# Patient Record
Sex: MALE | Race: WHITE | NOT HISPANIC OR LATINO | Employment: OTHER | URBAN - METROPOLITAN AREA
[De-identification: names, ages, dates, MRNs, and addresses within clinical notes are randomized per-mention and may not be internally consistent; named-entity substitution may affect disease eponyms.]

---

## 2023-08-28 ENCOUNTER — TELEPHONE (OUTPATIENT)
Dept: SCHEDULING | Facility: IMAGING CENTER | Age: 83
End: 2023-08-28

## 2023-08-31 ENCOUNTER — APPOINTMENT (OUTPATIENT)
Dept: MEDICAL GROUP | Facility: PHYSICIAN GROUP | Age: 83
End: 2023-08-31
Payer: COMMERCIAL

## 2023-09-05 ENCOUNTER — OFFICE VISIT (OUTPATIENT)
Dept: MEDICAL GROUP | Facility: PHYSICIAN GROUP | Age: 83
End: 2023-09-05
Payer: MEDICARE

## 2023-09-05 VITALS
OXYGEN SATURATION: 94 % | HEART RATE: 82 BPM | WEIGHT: 238 LBS | DIASTOLIC BLOOD PRESSURE: 70 MMHG | RESPIRATION RATE: 12 BRPM | HEIGHT: 69 IN | SYSTOLIC BLOOD PRESSURE: 112 MMHG | BODY MASS INDEX: 35.25 KG/M2 | TEMPERATURE: 98.2 F

## 2023-09-05 DIAGNOSIS — N40.1 BENIGN PROSTATIC HYPERPLASIA WITH NOCTURIA: ICD-10-CM

## 2023-09-05 DIAGNOSIS — D32.0 BENIGN MENINGIOMA OF BRAIN (HCC): ICD-10-CM

## 2023-09-05 DIAGNOSIS — R35.1 BENIGN PROSTATIC HYPERPLASIA WITH NOCTURIA: ICD-10-CM

## 2023-09-05 DIAGNOSIS — I25.10 CORONARY ARTERY DISEASE INVOLVING NATIVE CORONARY ARTERY OF NATIVE HEART WITHOUT ANGINA PECTORIS: ICD-10-CM

## 2023-09-05 DIAGNOSIS — I10 PRIMARY HYPERTENSION: ICD-10-CM

## 2023-09-05 DIAGNOSIS — I82.402 ACUTE DEEP VEIN THROMBOSIS (DVT) OF LEFT LOWER EXTREMITY, UNSPECIFIED VEIN (HCC): ICD-10-CM

## 2023-09-05 PROCEDURE — 99204 OFFICE O/P NEW MOD 45 MIN: CPT | Performed by: PHYSICIAN ASSISTANT

## 2023-09-05 PROCEDURE — 3074F SYST BP LT 130 MM HG: CPT | Performed by: PHYSICIAN ASSISTANT

## 2023-09-05 PROCEDURE — 3078F DIAST BP <80 MM HG: CPT | Performed by: PHYSICIAN ASSISTANT

## 2023-09-05 RX ORDER — METHYLPREDNISOLONE 4 MG/1
TABLET ORAL
COMMUNITY
Start: 2023-08-11 | End: 2023-10-17

## 2023-09-05 RX ORDER — CEFDINIR 300 MG/1
300 CAPSULE ORAL 2 TIMES DAILY
COMMUNITY
End: 2023-10-17

## 2023-09-05 RX ORDER — FINASTERIDE 5 MG/1
TABLET, FILM COATED ORAL
COMMUNITY
Start: 2023-07-18 | End: 2023-10-17

## 2023-09-05 RX ORDER — ALFUZOSIN HYDROCHLORIDE 10 MG/1
TABLET, EXTENDED RELEASE ORAL
COMMUNITY
Start: 2023-06-12 | End: 2023-10-17

## 2023-09-05 RX ORDER — CIPROFLOXACIN 500 MG/1
TABLET, FILM COATED ORAL
COMMUNITY
Start: 2023-06-26 | End: 2023-10-17

## 2023-09-05 RX ORDER — TAMSULOSIN HYDROCHLORIDE 0.4 MG/1
CAPSULE ORAL
COMMUNITY
Start: 2023-06-21 | End: 2023-10-17

## 2023-09-05 RX ORDER — CARVEDILOL 6.25 MG/1
6.25 TABLET ORAL 2 TIMES DAILY
COMMUNITY
Start: 2023-06-08 | End: 2023-10-17

## 2023-09-05 RX ORDER — DEXAMETHASONE 4 MG/1
TABLET ORAL
COMMUNITY
Start: 2023-08-22 | End: 2023-10-17

## 2023-09-05 RX ORDER — TRAVOPROST OPHTHALMIC SOLUTION 0.04 MG/ML
SOLUTION OPHTHALMIC
COMMUNITY
Start: 2023-07-24

## 2023-09-05 RX ORDER — ATORVASTATIN CALCIUM 40 MG/1
40 TABLET, FILM COATED ORAL DAILY
COMMUNITY
Start: 2023-06-08 | End: 2023-10-17

## 2023-09-05 RX ORDER — OLMESARTAN MEDOXOMIL 20 MG/1
20 TABLET ORAL DAILY
COMMUNITY
End: 2023-10-17

## 2023-09-05 RX ORDER — DOXYCYCLINE HYCLATE 100 MG
100 TABLET ORAL 2 TIMES DAILY
COMMUNITY
End: 2023-10-17

## 2023-09-05 ASSESSMENT — PATIENT HEALTH QUESTIONNAIRE - PHQ9: CLINICAL INTERPRETATION OF PHQ2 SCORE: 0

## 2023-09-05 NOTE — PROGRESS NOTES
CC:  Chief Complaint   Patient presents with    Establish Care     Had a fall 8/10, had tumor removed 8/16         HISTORY OF PRESENT ILLNESS: Patient is a 83 y.o. male established patient presenting with issues below  About a year ago pt was playing golf and could not finish game because his legs were giving out. Afterwards was having trouble with his R leg. Seen by neurosurgeon in [place] and found to have severe spinal stenosis and scheduled to have spine surgery. Also having speech troubles. Pt had fall in early August 2023 and seen in [facility] and found to to have a brain tumor. Had tumor removed which returned benign. Had repeat head CT yesterday and told that it returned excellent. Diagnosed with Category 1 meningioma.   Pt had L leg swelling last week and went back to [facility] and found to have L LE DVT. Started on eliquis. Leg swelling is improving.   Pt was prescribed alfuzosin in [place] but has not been taking it. He was having weak urinary stream and nocturia.   Pt has been on lipitor for many years for HLD.   Pt on olmesartan 20 mg for HTN. Was previously on Coreg and then [facility] switched him to metoprolol but his BP found to be 70 systolic. So switched back to Coreg.   Pt on abx now for prophylaxis. Started in hospital.   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     Current Outpatient Medications   Medication Sig Dispense Refill    atorvastatin (LIPITOR) 40 MG Tab Take 40 mg by mouth every day.      carvedilol (COREG) 6.25 MG Tab Take 6.25 mg by mouth 2 times a day.      travoprost (TRAVATAN Z) 0.004 % Solution INSTILL 1 DROP TO BOTH EYES AT BEDTIME      apixaban (ELIQUIS) 5mg Tab Take 5 mg by mouth 2 times a day.      cefdinir (OMNICEF) 300 MG Cap Take 300 mg by mouth 2 times a day.      doxycycline (VIBRAMYCIN) 100 MG Tab Take 100 mg by mouth 2 times a day.      olmesartan (BENICAR) 20 MG Tab Take 20 mg by mouth every day.      Evolocumab with Infusor (REPATHA) 420 MG/3.5ML On the body infusor  "injection Inject 420 mg under the skin every 28 days.      alfuzosin (UROXATRAL) 10 MG SR tablet TAKE ONE TABLET BY MOUTH EVERY DAY. DO NOT CRUSHED, CHEW, OR EXPECTORATE; SWALLOW SWALLOW THE WHOLE TABLET (CAPSULE) WITH WATER      ciprofloxacin (CIPRO) 500 MG Tab TAKE 1 TABLET BY MOUTH EVERY 12 HOURS AS DIRECTED FOR 14 DAYS (Patient not taking: Reported on 9/5/2023)      dexamethasone (DECADRON) 4 MG Tab  (Patient not taking: Reported on 9/5/2023)      finasteride (PROSCAR) 5 MG Tab TAKE 1 TABLET (5 MG) BY MOUTH DAILY. DO NOT CRUSH, CHEW, OR SPLIT; SWALLOW WHOLE (Patient not taking: Reported on 9/5/2023)      methylPREDNISolone (MEDROL DOSEPAK) 4 MG Tablet Therapy Pack FOLLOW PACKAGE DIRECTIONS (Patient not taking: Reported on 9/5/2023)      metoprolol tartrate (LOPRESSOR) 25 MG Tab  (Patient not taking: Reported on 9/5/2023)      tamsulosin (FLOMAX) 0.4 MG capsule TAKE 1 CAPSULE BY MOUTH EVERY DAY IN THE EVENING FOR 30 DAYS (Patient not taking: Reported on 9/5/2023)       No current facility-administered medications for this visit.        ROS:     ROS    Exam:    /70   Pulse 82   Temp 36.8 °C (98.2 °F) (Temporal)   Resp 12   Ht 1.753 m (5' 9\")   Wt 108 kg (238 lb)   SpO2 94%  Body mass index is 35.15 kg/m².    General:  Well nourished, well developed male in NAD  Head is grossly normal.  Neck: Supple.   Pulmonary: Clear to auscultation. No ronchi, wheezing or rales  Cardiac: Regular rate and rhythm. No murmurs.  Skin: Warm and dry. No obvious lesions  Neuro: Normal muscle tone. Gait normal. Alert and oriented.  Psych: Normal mood and affect      Please note that this dictation was created using voice recognition software. I have made every reasonable attempt to correct obvious errors, but I expect that there are errors of grammar and possibly content that I did not discover before finalizing the note.        Assessment/Plan:    1. Acute deep vein thrombosis (DVT) of left lower extremity, unspecified vein " (HCC)  Continue eliquis at current dose.     2. Coronary artery disease involving native coronary artery of native heart without angina pectoris  Stable on lipitor. I am questioning why he is on Repatha since he tolerates statins fine. Advised he should be fine to d/c repatha if he so chooses.     3. Primary hypertension  Well controlled     4. Benign prostatic hyperplasia with nocturia  Will address at later visit.     5. Benign meningioma of brain (HCC)  F/u with neurosurgeon

## 2023-09-15 ENCOUNTER — OFFICE VISIT (OUTPATIENT)
Dept: MEDICAL GROUP | Facility: PHYSICIAN GROUP | Age: 83
End: 2023-09-15
Payer: MEDICARE

## 2023-09-15 VITALS
TEMPERATURE: 97.7 F | HEART RATE: 87 BPM | WEIGHT: 245 LBS | SYSTOLIC BLOOD PRESSURE: 112 MMHG | BODY MASS INDEX: 36.29 KG/M2 | HEIGHT: 69 IN | OXYGEN SATURATION: 99 % | RESPIRATION RATE: 16 BRPM | DIASTOLIC BLOOD PRESSURE: 64 MMHG

## 2023-09-15 DIAGNOSIS — I82.402 ACUTE DEEP VEIN THROMBOSIS (DVT) OF LEFT LOWER EXTREMITY, UNSPECIFIED VEIN (HCC): ICD-10-CM

## 2023-09-15 PROCEDURE — 99214 OFFICE O/P EST MOD 30 MIN: CPT | Performed by: NURSE PRACTITIONER

## 2023-09-15 PROCEDURE — 3078F DIAST BP <80 MM HG: CPT | Performed by: NURSE PRACTITIONER

## 2023-09-15 PROCEDURE — 3074F SYST BP LT 130 MM HG: CPT | Performed by: NURSE PRACTITIONER

## 2023-09-15 RX ORDER — CARVEDILOL 3.12 MG/1
3.12 TABLET ORAL
COMMUNITY
Start: 2023-09-05 | End: 2023-09-15

## 2023-09-15 RX ORDER — ACETAMINOPHEN 325 MG/1
325-650 TABLET ORAL
COMMUNITY
Start: 2023-08-21

## 2023-09-15 RX ORDER — AMOXICILLIN 500 MG/1
500 TABLET, FILM COATED ORAL
COMMUNITY
Start: 2023-09-14 | End: 2023-10-17

## 2023-09-15 RX ORDER — OLMESARTAN MEDOXOMIL 20 MG/1
40 TABLET ORAL DAILY
COMMUNITY
End: 2023-10-17

## 2023-09-15 RX ORDER — TRAVOPROST OPHTHALMIC SOLUTION 0.04 MG/ML
1 SOLUTION OPHTHALMIC
COMMUNITY
End: 2023-10-17

## 2023-09-15 RX ORDER — ATORVASTATIN CALCIUM 20 MG/1
20 TABLET, FILM COATED ORAL DAILY
COMMUNITY
End: 2023-10-17

## 2023-09-15 RX ORDER — CARVEDILOL 3.12 MG/1
TABLET ORAL
COMMUNITY
Start: 2023-09-05 | End: 2023-11-07 | Stop reason: SDUPTHER

## 2023-09-15 ASSESSMENT — FIBROSIS 4 INDEX: FIB4 SCORE: 0.69

## 2023-09-19 ENCOUNTER — OFFICE VISIT (OUTPATIENT)
Dept: MEDICAL GROUP | Facility: PHYSICIAN GROUP | Age: 83
End: 2023-09-19
Payer: MEDICARE

## 2023-09-19 VITALS
WEIGHT: 239.6 LBS | HEIGHT: 69 IN | BODY MASS INDEX: 35.49 KG/M2 | HEART RATE: 96 BPM | DIASTOLIC BLOOD PRESSURE: 74 MMHG | TEMPERATURE: 97.2 F | SYSTOLIC BLOOD PRESSURE: 116 MMHG | OXYGEN SATURATION: 95 % | RESPIRATION RATE: 16 BRPM

## 2023-09-19 DIAGNOSIS — R60.0 LOWER EXTREMITY EDEMA: ICD-10-CM

## 2023-09-19 DIAGNOSIS — I82.402 ACUTE DEEP VEIN THROMBOSIS (DVT) OF LEFT LOWER EXTREMITY, UNSPECIFIED VEIN (HCC): ICD-10-CM

## 2023-09-19 PROCEDURE — 3074F SYST BP LT 130 MM HG: CPT | Performed by: NURSE PRACTITIONER

## 2023-09-19 PROCEDURE — 3078F DIAST BP <80 MM HG: CPT | Performed by: NURSE PRACTITIONER

## 2023-09-19 PROCEDURE — 99213 OFFICE O/P EST LOW 20 MIN: CPT | Performed by: NURSE PRACTITIONER

## 2023-09-19 ASSESSMENT — FIBROSIS 4 INDEX: FIB4 SCORE: 0.69

## 2023-09-20 ASSESSMENT — ENCOUNTER SYMPTOMS
CLAUDICATION: 0
SHORTNESS OF BREATH: 0
PALPITATIONS: 0
SHORTNESS OF BREATH: 0
BRUISES/BLEEDS EASILY: 0
NEUROLOGICAL NEGATIVE: 1
WEAKNESS: 0
FEVER: 0
HEADACHES: 0
ORTHOPNEA: 0
FEVER: 0
MUSCULOSKELETAL NEGATIVE: 1
WEIGHT LOSS: 0
COUGH: 0
DIZZINESS: 0
CHILLS: 0
CHILLS: 0
COUGH: 0

## 2023-09-20 NOTE — PROGRESS NOTES
CC:  Chief Complaint   Patient presents with    Follow-Up     On L leg, pt states he has a blood clot, and chest        HISTORY OF PRESENT ILLNESS: Patient is a 83 y.o. male established patient presenting     Problem   Acute Deep Vein Thrombosis (Dvt) of Left Lower Extremity (Hcc)    Patient presents the office today with continued weeping of left lower extremity, erythema and pitting edema.  Patient states that they were sent in by their home health nurse and were told to ask for diuretics, Unna boot and a wound consult.  Patient continues to have left lower extremity DVT that he continues to take Eliquis 5 mg twice daily 4.  He also continues to take his amoxicillin 500 mg 3 times daily and has 3 more days left.  Patient denies any chills, fevers, nausea, vomiting.  He states that he has been trying to keep the area dry as much as possible.  We discussed the pathology of the weeping and the DVT and I recommended that he follow-up with a vascular surgeon immediately for consult as this is possibly what is causing his significant weeping to the lower extremity due to returning blood flow.  Patient states that his neurosurgeon was specific that he should not have any more surgeries.  We discussed with patient and wife the importance of following up with vascular surgeon so they can get a educated discussion and further recommendations.  We will also place a referral to wound care as patient and wife are concerned about this as well.    9/15/2023  Patient was recently seen in the [facility] emergency room yesterday after having increased swelling to his left lower extremity, edema and weeping and erythema.  Patient was sent in by his home health nurse.  Patient was originally seen in ER on 8/31/2023 for a extensive LLE swelling which resulted in a  left lower extremity DVT.  Patient previously had a recent frontal craniotomy with resection of the large meningeal tumor on 8/16/2023.  Patient was started on Eliquis 8/31/2023  "and states he did have some improvement to his left lower extremity swelling.  Then followed up with primary care on 9/5/2023 it was noted that his left lower extremity was improving for swelling.  Unfortunately patient was seen back in the emergency room on 9/14/2023 due to increase in the swelling of left lower extremity, erythema and weeping.  Patient was started on amoxicillin 500 mg 3 times daily for 7 days, continue Eliquis 5 mg twice daily.  They did complete ultrasound, EKG, BNP for possible CHF which noted none.  Patient denies any chills, fevers, nausea, vomiting.  He does have 4+ pitting edema.  It was noted in the ER visit that he did have hypoalbuminemia with a sodium of 135 albumin of 2.8 and protein of 5.8.  They have not been able to  his amoxicillin that was prescribed yesterday however we did call his pharmacy today and they do have it readily available for him to  today.  He denies any chest pain, orthopnea, dyspnea.           Review of Systems   Constitutional:  Negative for chills, fever and malaise/fatigue.   Respiratory:  Negative for cough and shortness of breath.    Cardiovascular:  Positive for leg swelling. Negative for chest pain.   Musculoskeletal: Negative.    Skin:         Left lower extremity swelling, weeping, pitting edema   Neurological:  Negative for dizziness, weakness and headaches.   Endo/Heme/Allergies:  Does not bruise/bleed easily.             - NOTE: All other systems reviewed and are negative, except as in HPI.      Exam:    /74 (BP Location: Left arm, Patient Position: Sitting, BP Cuff Size: Adult)   Pulse 96   Temp 36.2 °C (97.2 °F) (Temporal)   Resp 16   Ht 1.753 m (5' 9\")   Wt 109 kg (239 lb 9.6 oz)   SpO2 95%  Body mass index is 35.38 kg/m².    Physical Exam  Constitutional:       General: He is not in acute distress.     Appearance: Normal appearance. He is not ill-appearing.   HENT:      Head: Normocephalic and atraumatic.   Pulmonary:      " Effort: Pulmonary effort is normal.   Skin:     General: Skin is warm.      Capillary Refill: Capillary refill takes less than 2 seconds.      Findings: Erythema present.      Comments: Weeping serous fluids, erythema regards the way up lower extremity calf with some noted blistering and 3+ pitting edema   Neurological:      Mental Status: He is alert and oriented to person, place, and time.   Psychiatric:         Behavior: Behavior normal.           Assessment/Plan:    1. Acute deep vein thrombosis (DVT) of left lower extremity, unspecified vein (HCC)  Acute uncomplicated condition  - Referral to Vascular Surgery  - Referral to Wound Clinic    2. Lower extremity edema  Acute uncomplicated condition  - Referral to Vascular Surgery  - Referral to Wound Clinic       Discussed with patient possible alternative diagnoses, patient is to take all medications as prescribed.     If symptoms persist FU w/PCP, if symptoms worsen go to emergency room.     If experiencing any side effects from prescribed medications reports to the office immediately or go to emergency room.    Reviewed indication, dosage, usage and potential adverse effects of prescribed medications.     Reviewed risks and benefits of treatment plan. Patient verbalizes understanding of all instruction and verbally agrees to plan.      Return for Follow-up for worsening conditions.      Please note that this dictation was created using voice recognition software. I have made every reasonable attempt to correct obvious errors, but I expect that there are errors of grammar and possibly content that I did not discover before finalizing the note.

## 2023-10-12 ENCOUNTER — OFFICE VISIT (OUTPATIENT)
Dept: MEDICAL GROUP | Facility: PHYSICIAN GROUP | Age: 83
End: 2023-10-12
Payer: MEDICARE

## 2023-10-12 VITALS
RESPIRATION RATE: 16 BRPM | HEIGHT: 69 IN | TEMPERATURE: 98.3 F | HEART RATE: 91 BPM | SYSTOLIC BLOOD PRESSURE: 124 MMHG | BODY MASS INDEX: 35.58 KG/M2 | DIASTOLIC BLOOD PRESSURE: 60 MMHG | OXYGEN SATURATION: 96 % | WEIGHT: 240.2 LBS

## 2023-10-12 DIAGNOSIS — B97.29 OTHER CORONAVIRUS AS THE CAUSE OF DISEASES CLASSIFIED ELSEWHERE: ICD-10-CM

## 2023-10-12 DIAGNOSIS — R05.9 COUGH IN ADULT: ICD-10-CM

## 2023-10-12 PROBLEM — L97.929 IDIOPATHIC CHRONIC VENOUS HYPERTENSION OF LEFT LOWER EXTREMITY WITH ULCER (HCC): Status: ACTIVE | Noted: 2023-10-02

## 2023-10-12 PROBLEM — I87.319 VENOUS ULCER OF LOWER EXTREMITY DUE TO CHRONIC PERIPHERAL VENOUS HYPERTENSION (HCC): Status: ACTIVE | Noted: 2023-10-12

## 2023-10-12 PROBLEM — Z79.01 LONG TERM CURRENT USE OF ANTICOAGULANT THERAPY: Status: ACTIVE | Noted: 2023-10-12

## 2023-10-12 PROBLEM — I87.312 IDIOPATHIC CHRONIC VENOUS HYPERTENSION OF LEFT LOWER EXTREMITY WITH ULCER (HCC): Status: ACTIVE | Noted: 2023-10-02

## 2023-10-12 PROBLEM — I82.412 ACUTE DEEP VEIN THROMBOSIS (DVT) OF FEMORAL VEIN OF LEFT LOWER EXTREMITY (HCC): Status: ACTIVE | Noted: 2023-09-05

## 2023-10-12 PROBLEM — D32.0 CEREBRAL MENINGIOMA (HCC): Status: ACTIVE | Noted: 2023-08-11

## 2023-10-12 LAB
FLUAV RNA SPEC QL NAA+PROBE: NEGATIVE
FLUBV RNA SPEC QL NAA+PROBE: NEGATIVE
RSV RNA SPEC QL NAA+PROBE: NEGATIVE
SARS-COV-2 RNA RESP QL NAA+PROBE: NEGATIVE

## 2023-10-12 PROCEDURE — 0241U POCT CEPHEID COV-2, FLU A/B, RSV - PCR: CPT | Performed by: NURSE PRACTITIONER

## 2023-10-12 PROCEDURE — 99213 OFFICE O/P EST LOW 20 MIN: CPT | Performed by: NURSE PRACTITIONER

## 2023-10-12 PROCEDURE — 3078F DIAST BP <80 MM HG: CPT | Performed by: NURSE PRACTITIONER

## 2023-10-12 PROCEDURE — 3074F SYST BP LT 130 MM HG: CPT | Performed by: NURSE PRACTITIONER

## 2023-10-12 RX ORDER — ATORVASTATIN CALCIUM 80 MG/1
1 TABLET, FILM COATED ORAL DAILY
COMMUNITY
End: 2023-10-17

## 2023-10-12 RX ORDER — OLMESARTAN MEDOXOMIL 40 MG/1
1 TABLET ORAL DAILY
COMMUNITY

## 2023-10-12 RX ORDER — TIRZEPATIDE 7.5 MG/.5ML
INJECTION, SOLUTION SUBCUTANEOUS
COMMUNITY
End: 2023-10-17

## 2023-10-12 RX ORDER — TIRZEPATIDE 2.5 MG/.5ML
INJECTION, SOLUTION SUBCUTANEOUS
COMMUNITY
End: 2023-10-17

## 2023-10-12 RX ORDER — CHLORTHALIDONE 25 MG/1
1 TABLET ORAL DAILY
COMMUNITY

## 2023-10-12 RX ORDER — ATORVASTATIN CALCIUM 40 MG/1
1 TABLET, FILM COATED ORAL DAILY
COMMUNITY

## 2023-10-12 RX ORDER — OMEPRAZOLE 20 MG/1
20 CAPSULE, DELAYED RELEASE ORAL DAILY
Qty: 30 CAPSULE | Refills: 0 | Status: SHIPPED | OUTPATIENT
Start: 2023-10-12 | End: 2023-11-07

## 2023-10-12 ASSESSMENT — ENCOUNTER SYMPTOMS
CONSTIPATION: 0
SORE THROAT: 0
WEAKNESS: 0
SINUS PAIN: 0
HEMOPTYSIS: 0
ABDOMINAL PAIN: 0
STRIDOR: 0
NAUSEA: 0
SHORTNESS OF BREATH: 0
DIARRHEA: 1
FEVER: 0
SPUTUM PRODUCTION: 0
VOMITING: 0
DIZZINESS: 0
CHILLS: 0
WEIGHT LOSS: 0
HEADACHES: 0
WHEEZING: 0
COUGH: 1

## 2023-10-12 ASSESSMENT — FIBROSIS 4 INDEX: FIB4 SCORE: 0.69

## 2023-10-12 NOTE — PROGRESS NOTES
CC:  Chief Complaint   Patient presents with    Cough     Worse in the last 3 days, aug 16 brain tumor        HISTORY OF PRESENT ILLNESS: Patient is a 83 y.o. male established patient presenting     Problem         Cough in Adult    Patient states that back in September when he had surgery he noticed he started to have a cough after surgery.  They did test him for pneumonia and COVID in the hospital however everything came back negative.  He states that since September he has had intermittent coughing which recently stopped roughly a week ago however in the last 3 days has worsened.  Recently had vascular surgeon completed CT scan to check for pulmonary embolism however this was negative    Wife and patient both state that he is coughing typically all day long and the only time he gets relief is when he lays on his side  Occasionally he feels that there is something in his throat that is when he is trying to cough it up.  He also notes an increase in burping however denies any type of acid.  He does drink orange juice on a daily basis as well as occasionally eats spicy foods.  Patient is slightly overweight with a BMI of 35.47   Denies any phlegm, chills, fevers, bitter taste              Review of Systems   Constitutional:  Negative for chills, fever, malaise/fatigue and weight loss.   HENT:  Negative for congestion, sinus pain and sore throat.    Respiratory:  Positive for cough. Negative for hemoptysis, sputum production, shortness of breath, wheezing and stridor.    Cardiovascular:  Negative for chest pain.   Gastrointestinal:  Positive for diarrhea. Negative for abdominal pain, constipation, nausea and vomiting.   Neurological:  Negative for dizziness, weakness and headaches.             - NOTE: All other systems reviewed and are negative, except as in HPI.      Exam:    /60 (BP Location: Right arm, Patient Position: Sitting, BP Cuff Size: Adult)   Pulse 91   Temp 36.8 °C (98.3 °F) (Temporal)   Resp 16  "  Ht 1.753 m (5' 9\")   Wt 109 kg (240 lb 3.2 oz)   SpO2 96%  Body mass index is 35.47 kg/m².    Physical Exam  Constitutional:       General: He is not in acute distress.     Appearance: Normal appearance. He is not ill-appearing.   HENT:      Head: Normocephalic and atraumatic.      Right Ear: Tympanic membrane, ear canal and external ear normal.      Left Ear: Tympanic membrane, ear canal and external ear normal.      Nose: Nose normal. No congestion or rhinorrhea.      Mouth/Throat:      Mouth: Mucous membranes are moist.      Pharynx: Oropharynx is clear. No oropharyngeal exudate or posterior oropharyngeal erythema.   Eyes:      Extraocular Movements: Extraocular movements intact.      Conjunctiva/sclera: Conjunctivae normal.      Pupils: Pupils are equal, round, and reactive to light.   Cardiovascular:      Rate and Rhythm: Normal rate and regular rhythm.      Pulses: Normal pulses.      Heart sounds: Normal heart sounds.   Pulmonary:      Effort: Pulmonary effort is normal.      Breath sounds: Normal breath sounds.   Skin:     General: Skin is warm and dry.      Capillary Refill: Capillary refill takes less than 2 seconds.   Neurological:      Mental Status: He is alert and oriented to person, place, and time.   Psychiatric:         Mood and Affect: Mood normal.         Behavior: Behavior normal.         Thought Content: Thought content normal.         Judgment: Judgment normal.           Assessment/Plan:    1. Cough in adult  Acute and uncomplicated condition   Possible GERD will trial prilosec follow up in 1-2 weeks   - omeprazole (PRILOSEC) 20 MG delayed-release capsule; Take 1 Capsule by mouth every day.  Dispense: 30 Capsule; Refill: 0  - POCT Cepheid CoV-2, Flu A/B, RSV - PCR-negative    2. Other coronavirus as the cause of diseases classified elsewhere  Acute and uncomplicated condition   - POCT Cepheid CoV-2, Flu A/B, RSV - PCR       Discussed with patient possible alternative diagnoses, patient is " to take all medications as prescribed.     If symptoms persist FU w/PCP, if symptoms worsen go to emergency room.     If experiencing any side effects from prescribed medications reports to the office immediately or go to emergency room.    Reviewed indication, dosage, usage and potential adverse effects of prescribed medications.     Reviewed risks and benefits of treatment plan. Patient verbalizes understanding of all instruction and verbally agrees to plan.      Return for pending covid testing and follow up prilosec.      Please note that this dictation was created using voice recognition software. I have made every reasonable attempt to correct obvious errors, but I expect that there are errors of grammar and possibly content that I did not discover before finalizing the note.

## 2023-10-17 ENCOUNTER — OFFICE VISIT (OUTPATIENT)
Dept: MEDICAL GROUP | Facility: PHYSICIAN GROUP | Age: 83
End: 2023-10-17
Payer: MEDICARE

## 2023-10-17 VITALS
SYSTOLIC BLOOD PRESSURE: 84 MMHG | OXYGEN SATURATION: 94 % | BODY MASS INDEX: 35.27 KG/M2 | DIASTOLIC BLOOD PRESSURE: 60 MMHG | WEIGHT: 238.1 LBS | HEIGHT: 69 IN | TEMPERATURE: 96.9 F | HEART RATE: 105 BPM | RESPIRATION RATE: 12 BRPM

## 2023-10-17 DIAGNOSIS — I10 PRIMARY HYPERTENSION: ICD-10-CM

## 2023-10-17 DIAGNOSIS — R05.2 SUBACUTE COUGH: ICD-10-CM

## 2023-10-17 DIAGNOSIS — R05.9 COUGH IN ADULT: ICD-10-CM

## 2023-10-17 PROCEDURE — 99214 OFFICE O/P EST MOD 30 MIN: CPT | Performed by: STUDENT IN AN ORGANIZED HEALTH CARE EDUCATION/TRAINING PROGRAM

## 2023-10-17 PROCEDURE — 3074F SYST BP LT 130 MM HG: CPT | Performed by: STUDENT IN AN ORGANIZED HEALTH CARE EDUCATION/TRAINING PROGRAM

## 2023-10-17 PROCEDURE — 3078F DIAST BP <80 MM HG: CPT | Performed by: STUDENT IN AN ORGANIZED HEALTH CARE EDUCATION/TRAINING PROGRAM

## 2023-10-17 RX ORDER — AMOXICILLIN AND CLAVULANATE POTASSIUM 875; 125 MG/1; MG/1
1 TABLET, FILM COATED ORAL 2 TIMES DAILY
Qty: 14 TABLET | Refills: 0 | Status: SHIPPED | OUTPATIENT
Start: 2023-10-17 | End: 2023-10-24

## 2023-10-17 RX ORDER — BENZONATATE 100 MG/1
100 CAPSULE ORAL 3 TIMES DAILY PRN
Qty: 60 CAPSULE | Refills: 0 | Status: SHIPPED | OUTPATIENT
Start: 2023-10-17 | End: 2023-11-07 | Stop reason: SDUPTHER

## 2023-10-17 ASSESSMENT — FIBROSIS 4 INDEX: FIB4 SCORE: 0.69

## 2023-10-17 NOTE — PROGRESS NOTES
HISTORY OF PRESENT ILLNESS: Orion is a pleasant 83 y.o. male, established patient who presents today to discuss medical problems as listed below:    #cough:  Patient having persistent cough for over 1 month now.  He was last seen a week ago and was thought to may be due to GERD.  He has been taking Prilosec with no benefit.  He had a CT scan done on 1012 to check for pulmonary embolism as he does have a DVT, CTA at that time showed a complex left-sided pleural effusion and to consider pneumonia process.  He has not been having any fevers, nausea, vomiting, dizziness, chest pain.  Does report to cough with yellow-green sputum production feels like it is worsening.      Current Outpatient Medications Ordered in Epic   Medication Sig Dispense Refill    amoxicillin-clavulanate (AUGMENTIN) 875-125 MG Tab Take 1 Tablet by mouth 2 times a day for 7 days. 14 Tablet 0    benzonatate (TESSALON) 100 MG Cap Take 1 Capsule by mouth 3 times a day as needed for Cough. 60 Capsule 0    atorvastatin (LIPITOR) 40 MG Tab Take 1 Tablet by mouth every day.      chlorthalidone (HYGROTON) 25 MG Tab Take 1 Tablet by mouth every day.      olmesartan (BENICAR) 40 MG Tab Take 1 Tablet by mouth every day.      omeprazole (PRILOSEC) 20 MG delayed-release capsule Take 1 Capsule by mouth every day. 30 Capsule 0    acetaminophen (TYLENOL) 325 MG Tab Take 325-650 mg by mouth.      carvedilol (COREG) 3.125 MG Tab TAKE 1 TABLET BY MOUTH 2 TIMES A DAY FOR 90 DAYS. INDICATIONS: HIGH BLOOD PRESSURE DISORDER      travoprost (TRAVATAN Z) 0.004 % Solution INSTILL 1 DROP TO BOTH EYES AT BEDTIME      apixaban (ELIQUIS) 5mg Tab Take 5 mg by mouth 2 times a day.       No current [facility]-ordered facility-administered medications on file.       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) 10/02/2023    Acute deep vein thrombosis (DVT) of femoral vein of left lower extremity (HCC) 09/05/2023    Coronary artery disease involving native coronary artery of native heart without angina pectoris 09/05/2023    Benign prostatic hyperplasia with nocturia 09/05/2023    Primary hypertension 09/05/2023    Cerebral meningioma (HCC) 08/11/2023     Past Surgical History:   Procedure Laterality Date    CRANIOTOMY      STENT PLACEMENT       Social History     Tobacco Use    Smoking status: Never    Smokeless tobacco: Never   Vaping Use    Vaping Use: Never used   Substance Use Topics    Alcohol use: Yes     Comment: occ    Drug use: Never      History reviewed. No pertinent family history.  Current Outpatient Medications   Medication Sig Dispense Refill    amoxicillin-clavulanate (AUGMENTIN) 875-125 MG Tab Take 1 Tablet by mouth 2 times a day for 7 days. 14 Tablet 0    benzonatate (TESSALON) 100 MG Cap Take 1 Capsule by mouth 3 times a day as needed for Cough. 60 Capsule 0    atorvastatin (LIPITOR) 40 MG Tab Take 1 Tablet by mouth every day.      chlorthalidone (HYGROTON) 25 MG Tab Take 1 Tablet by mouth every day.      olmesartan (BENICAR) 40 MG Tab Take 1 Tablet by mouth every day.      omeprazole (PRILOSEC) 20 MG delayed-release capsule Take 1 Capsule by mouth every day. 30 Capsule 0    acetaminophen (TYLENOL) 325 MG Tab Take 325-650 mg by mouth.      carvedilol (COREG) 3.125 MG Tab TAKE 1 TABLET BY MOUTH 2 TIMES A DAY FOR 90 DAYS. INDICATIONS: HIGH BLOOD PRESSURE DISORDER      travoprost (TRAVATAN Z) 0.004 % Solution INSTILL 1 DROP TO BOTH EYES AT BEDTIME      apixaban (ELIQUIS) 5mg Tab Take 5 mg by mouth 2 times a day.       No current facility-administered medications for this visit.       Allergies:  No Known Allergies    Allergies, past medical history, past surgical history,  "family history, social history reviewed and updated.    Objective:    BP (!) 84/60 (BP Location: Right arm, Patient Position: Sitting, BP Cuff Size: Adult)   Pulse (!) 105   Temp 36.1 °C (96.9 °F) (Temporal)   Resp 12   Ht 1.753 m (5' 9\")   Wt 108 kg (238 lb 1.6 oz)   SpO2 94%   BMI 35.16 kg/m²    Body mass index is 35.16 kg/m².    Physical exam:  General: Normal appearance, no acute distress, not ill-appearing  HEENT: EOM intact, conjunctiva normal limits, negative right/left eye discharge.  Sclera anicteric  Cardiovascular: Normal rate and rhythm, no murmurs  Pulmonary: No respiratory distress, no wheezing, no rales. Left sided decreased air movement slight crackles at base.   Musculoskeletal: No edema bilaterally  Skin: Warm, dry, no lesions  Neurological: No focal deficits, normal gait  Psychiatric: Mood within normal limits    Assessment/Plan:    Problem List Items Addressed This Visit       Primary hypertension     Hypotensive today, no symptoms.     advised to hold coreg, chlorathalidone today. Stay well hydrated.  Check bp, rtc next week for follow up.              Cough in adult     Reviewed CTA chest 10/12/2023, complex left-sided pleural effusion evidence of pleural thickening superimposed infectious process possible.      rx augmentin 7 days  If not improving will place referral for pulmonology           Other Visit Diagnoses       Subacute cough        Relevant Medications    benzonatate (TESSALON) 100 MG Cap    Other Relevant Orders    Referral to Pulmonary and Sleep Medicine             Return in about 1 week (around 10/24/2023) for blood pressure.   "

## 2023-10-17 NOTE — ASSESSMENT & PLAN NOTE
Hypotensive today, no symptoms.     advised to hold coreg, chlorathalidone today. Stay well hydrated.  Check bp, rtc next week for follow up.

## 2023-10-17 NOTE — ASSESSMENT & PLAN NOTE
Reviewed CTA chest 10/12/2023, complex left-sided pleural effusion evidence of pleural thickening superimposed infectious process possible.      rx augmentin 7 days  If not improving will place referral for pulmonology

## 2023-10-23 ENCOUNTER — TELEPHONE (OUTPATIENT)
Dept: HEALTH INFORMATION MANAGEMENT | Facility: OTHER | Age: 83
End: 2023-10-23
Payer: COMMERCIAL

## 2023-10-24 ENCOUNTER — OFFICE VISIT (OUTPATIENT)
Dept: MEDICAL GROUP | Facility: PHYSICIAN GROUP | Age: 83
End: 2023-10-24
Payer: MEDICARE

## 2023-10-24 VITALS
HEART RATE: 101 BPM | DIASTOLIC BLOOD PRESSURE: 68 MMHG | RESPIRATION RATE: 12 BRPM | HEIGHT: 69 IN | SYSTOLIC BLOOD PRESSURE: 118 MMHG | OXYGEN SATURATION: 97 % | WEIGHT: 243 LBS | TEMPERATURE: 97 F | BODY MASS INDEX: 35.99 KG/M2

## 2023-10-24 DIAGNOSIS — R05.9 COUGH IN ADULT: ICD-10-CM

## 2023-10-24 PROCEDURE — 99213 OFFICE O/P EST LOW 20 MIN: CPT | Performed by: PHYSICIAN ASSISTANT

## 2023-10-24 PROCEDURE — 3078F DIAST BP <80 MM HG: CPT | Performed by: PHYSICIAN ASSISTANT

## 2023-10-24 PROCEDURE — 3074F SYST BP LT 130 MM HG: CPT | Performed by: PHYSICIAN ASSISTANT

## 2023-10-24 RX ORDER — DOXYCYCLINE HYCLATE 100 MG
100 TABLET ORAL 2 TIMES DAILY
Qty: 14 TABLET | Refills: 0 | Status: SHIPPED | OUTPATIENT
Start: 2023-10-24 | End: 2023-11-07 | Stop reason: SDUPTHER

## 2023-10-24 ASSESSMENT — ENCOUNTER SYMPTOMS
SPUTUM PRODUCTION: 1
HEMOPTYSIS: 0
FEVER: 0
SHORTNESS OF BREATH: 1
HEARTBURN: 0
COUGH: 1
CHILLS: 0
WHEEZING: 0

## 2023-10-24 ASSESSMENT — FIBROSIS 4 INDEX: FIB4 SCORE: 0.69

## 2023-10-24 NOTE — PROGRESS NOTES
CC:  Chief Complaint   Patient presents with    Follow-Up     Cough        HISTORY OF PRESENT ILLNESS: Patient is a 83 y.o. male established patient presenting with issues below  Pt has been having a cough for the past month. Treated with prilosec which did not have any response. Seen by Dr Espana last week and treated with augmentin. Since then has been on and off coughing fits. Had appt with Dr Loja and told that his lungs did not have any blood clots. Has had some improvement with augmentin and has been having less mucus production but still persistent cough. Has baseline of SOB but denies worsening SOB. No wheezing.     Current Outpatient Medications   Medication Sig Dispense Refill    benzonatate (TESSALON) 100 MG Cap Take 1 Capsule by mouth 3 times a day as needed for Cough. 60 Capsule 0    atorvastatin (LIPITOR) 40 MG Tab Take 1 Tablet by mouth every day.      olmesartan (BENICAR) 40 MG Tab Take 1 Tablet by mouth every day.      omeprazole (PRILOSEC) 20 MG delayed-release capsule Take 1 Capsule by mouth every day. 30 Capsule 0    acetaminophen (TYLENOL) 325 MG Tab Take 325-650 mg by mouth.      carvedilol (COREG) 3.125 MG Tab TAKE 1 TABLET BY MOUTH 2 TIMES A DAY FOR 90 DAYS. INDICATIONS: HIGH BLOOD PRESSURE DISORDER      travoprost (TRAVATAN Z) 0.004 % Solution INSTILL 1 DROP TO BOTH EYES AT BEDTIME      apixaban (ELIQUIS) 5mg Tab Take 5 mg by mouth 2 times a day.      amoxicillin-clavulanate (AUGMENTIN) 875-125 MG Tab Take 1 Tablet by mouth 2 times a day for 7 days. (Patient not taking: Reported on 10/24/2023) 14 Tablet 0    chlorthalidone (HYGROTON) 25 MG Tab Take 1 Tablet by mouth every day. (Patient not taking: Reported on 10/24/2023)       No current facility-administered medications for this visit.        ROS:     Review of Systems   Constitutional:  Negative for chills and fever.   Respiratory:  Positive for cough, sputum production and shortness of breath. Negative for hemoptysis and wheezing.   "  Cardiovascular:  Negative for chest pain.   Gastrointestinal:  Negative for heartburn.       Exam:    /68   Pulse (!) 101   Temp 36.1 °C (97 °F) (Temporal)   Resp 12   Ht 1.753 m (5' 9\")   Wt 110 kg (243 lb)   SpO2 97%  Body mass index is 35.88 kg/m².    General:  Well nourished, well developed male in NAD  Head is grossly normal.  Neck: Supple.   Pulmonary: Clear to auscultation. No ronchi, wheezing or rales  Cardiac: Regular rate and rhythm. No murmurs.  Skin: Warm and dry. No obvious lesions  Neuro: Normal muscle tone. Gait normal. Alert and oriented.  Psych: Normal mood and affect      Please note that this dictation was created using voice recognition software. I have made every reasonable attempt to correct obvious errors, but I expect that there are errors of grammar and possibly content that I did not discover before finalizing the note.        Assessment/Plan:    1. Cough in adult  Will trial on doxycycline. Given contact info for pulmonologist.   - doxycycline (VIBRAMYCIN) 100 MG Tab; Take 1 Tablet by mouth 2 times a day.  Dispense: 14 Tablet; Refill: 0           "

## 2023-11-04 DIAGNOSIS — R05.9 COUGH IN ADULT: ICD-10-CM

## 2023-11-06 NOTE — TELEPHONE ENCOUNTER
Received request via: Patient    Was the patient seen in the last year in this department? Yes    Does the patient have an active prescription (recently filled or refills available) for medication(s) requested? No    Does the patient have [facility] and need 100 day supply (blood pressure, diabetes and cholesterol meds only)? Patient does not have SCP    Patient is requesting a 90 day supply

## 2023-11-07 ENCOUNTER — OFFICE VISIT (OUTPATIENT)
Dept: MEDICAL GROUP | Facility: PHYSICIAN GROUP | Age: 83
End: 2023-11-07
Payer: MEDICARE

## 2023-11-07 VITALS
HEIGHT: 69 IN | RESPIRATION RATE: 12 BRPM | SYSTOLIC BLOOD PRESSURE: 118 MMHG | HEART RATE: 99 BPM | DIASTOLIC BLOOD PRESSURE: 56 MMHG | TEMPERATURE: 96.9 F | BODY MASS INDEX: 34.07 KG/M2 | WEIGHT: 230 LBS | OXYGEN SATURATION: 97 %

## 2023-11-07 DIAGNOSIS — R05.9 COUGH IN ADULT: ICD-10-CM

## 2023-11-07 DIAGNOSIS — R05.2 SUBACUTE COUGH: ICD-10-CM

## 2023-11-07 DIAGNOSIS — I10 PRIMARY HYPERTENSION: ICD-10-CM

## 2023-11-07 PROCEDURE — 3074F SYST BP LT 130 MM HG: CPT | Performed by: PHYSICIAN ASSISTANT

## 2023-11-07 PROCEDURE — 99213 OFFICE O/P EST LOW 20 MIN: CPT | Performed by: PHYSICIAN ASSISTANT

## 2023-11-07 PROCEDURE — 3078F DIAST BP <80 MM HG: CPT | Performed by: PHYSICIAN ASSISTANT

## 2023-11-07 RX ORDER — OMEPRAZOLE 20 MG/1
20 CAPSULE, DELAYED RELEASE ORAL DAILY
Qty: 90 CAPSULE | Refills: 1 | Status: SHIPPED | OUTPATIENT
Start: 2023-11-07

## 2023-11-07 RX ORDER — CARVEDILOL 3.12 MG/1
TABLET ORAL
Qty: 180 TABLET | Refills: 1 | Status: SHIPPED | OUTPATIENT
Start: 2023-11-07

## 2023-11-07 RX ORDER — BENZONATATE 100 MG/1
100 CAPSULE ORAL 3 TIMES DAILY PRN
Qty: 60 CAPSULE | Refills: 0 | Status: SHIPPED | OUTPATIENT
Start: 2023-11-07

## 2023-11-07 RX ORDER — DOXYCYCLINE HYCLATE 100 MG
100 TABLET ORAL 2 TIMES DAILY
Qty: 28 TABLET | Refills: 0 | Status: SHIPPED | OUTPATIENT
Start: 2023-11-07

## 2023-11-07 ASSESSMENT — FIBROSIS 4 INDEX: FIB4 SCORE: 0.69

## 2023-11-07 ASSESSMENT — ENCOUNTER SYMPTOMS
WHEEZING: 0
HEMOPTYSIS: 0
FEVER: 0
SPUTUM PRODUCTION: 1
COUGH: 1
SHORTNESS OF BREATH: 0
CHILLS: 0

## 2023-11-07 NOTE — PROGRESS NOTES
CC:  Chief Complaint   Patient presents with    Cough     F/u for cough       HISTORY OF PRESENT ILLNESS: Patient is a 83 y.o. male established patient presenting with issues below  Pt reprots that doxycycline worked great for a week and then the cough resumed as soon as abx finished. Cough is productive and worse in the evening. Wife notes that his coughing fits can last for hours. Has not made appointment with pulmonologist yet because they cannot get a hold of the pulmonology office.    Current Outpatient Medications   Medication Sig Dispense Refill    carvedilol (COREG) 3.125 MG Tab TAKE 1 TABLET BY MOUTH 2 TIMES A DAY FOR 90 DAYS. INDICATIONS: HIGH BLOOD PRESSURE DISORDER 180 Tablet 1    benzonatate (TESSALON) 100 MG Cap Take 1 Capsule by mouth 3 times a day as needed for Cough. 60 Capsule 0    doxycycline (VIBRAMYCIN) 100 MG Tab Take 1 Tablet by mouth 2 times a day. 28 Tablet 0    atorvastatin (LIPITOR) 40 MG Tab Take 1 Tablet by mouth every day.      olmesartan (BENICAR) 40 MG Tab Take 1 Tablet by mouth every day.      acetaminophen (TYLENOL) 325 MG Tab Take 325-650 mg by mouth.      travoprost (TRAVATAN Z) 0.004 % Solution INSTILL 1 DROP TO BOTH EYES AT BEDTIME      apixaban (ELIQUIS) 5mg Tab Take 5 mg by mouth 2 times a day.      omeprazole (PRILOSEC) 20 MG delayed-release capsule TAKE 1 CAPSULE BY MOUTH EVERY DAY (Patient not taking: Reported on 11/7/2023) 90 Capsule 1    chlorthalidone (HYGROTON) 25 MG Tab Take 1 Tablet by mouth every day. (Patient not taking: Reported on 10/24/2023)       No current facility-administered medications for this visit.        ROS:     Review of Systems   Constitutional:  Negative for chills and fever.   HENT:  Negative for congestion.    Respiratory:  Positive for cough and sputum production. Negative for hemoptysis, shortness of breath and wheezing.    Cardiovascular:  Negative for chest pain.       Exam:    /56 (BP Location: Right arm, Patient Position: Sitting, BP  "Cuff Size: Adult)   Pulse 99   Temp 36.1 °C (96.9 °F) (Temporal)   Resp 12   Ht 1.753 m (5' 9\")   Wt 104 kg (230 lb)   SpO2 97%  Body mass index is 33.97 kg/m².    General:  Well nourished, well developed male in NAD  Head is grossly normal.  Neck: Supple.   Pulmonary: Clear to auscultation. No ronchi, wheezing or rales  Cardiac: Regular rate and rhythm. No murmurs.  Skin: Warm and dry. No obvious lesions  Neuro: Normal muscle tone. Gait normal. Alert and oriented.  Psych: Normal mood and affect      Please note that this dictation was created using voice recognition software. I have made every reasonable attempt to correct obvious errors, but I expect that there are errors of grammar and possibly content that I did not discover before finalizing the note.        Assessment/Plan:    1. Primary hypertension  Refill sent in.   - carvedilol (COREG) 3.125 MG Tab; TAKE 1 TABLET BY MOUTH 2 TIMES A DAY FOR 90 DAYS. INDICATIONS: HIGH BLOOD PRESSURE DISORDER  Dispense: 180 Tablet; Refill: 1    2. Subacute cough    - benzonatate (TESSALON) 100 MG Cap; Take 1 Capsule by mouth 3 times a day as needed for Cough.  Dispense: 60 Capsule; Refill: 0    3. Cough in adult  Will trial on extended course of doxycycline. F/u with pulmonology if not improving  - doxycycline (VIBRAMYCIN) 100 MG Tab; Take 1 Tablet by mouth 2 times a day.  Dispense: 28 Tablet; Refill: 0           "

## 2023-11-08 ENCOUNTER — APPOINTMENT (OUTPATIENT)
Dept: MEDICAL GROUP | Facility: PHYSICIAN GROUP | Age: 83
End: 2023-11-08
Payer: COMMERCIAL

## 2024-05-02 DIAGNOSIS — I10 PRIMARY HYPERTENSION: ICD-10-CM

## 2024-05-02 RX ORDER — CARVEDILOL 3.12 MG/1
TABLET ORAL
Qty: 180 TABLET | Refills: 1 | Status: SHIPPED | OUTPATIENT
Start: 2024-05-02

## 2024-05-02 NOTE — TELEPHONE ENCOUNTER
Received request via: Pharmacy    Was the patient seen in the last year in this department? Yes    Does the patient have an active prescription (recently filled or refills available) for medication(s) requested? No    Pharmacy Name: [facility]     Does the patient have shelter Plus and need 100 day supply (blood pressure, diabetes and cholesterol meds only)? Patient does not have SCP

## 2024-05-02 NOTE — TELEPHONE ENCOUNTER
Received request via: Patient    Was the patient seen in the last year in this department? Yes    Does the patient have an active prescription (recently filled or refills available) for medication(s) requested? No    [facility] Name[facility]    Does the patient have custodial Plus and need 100 day supply (blood pressure, diabetes and cholesterol meds only)? Patient does not have SCP

## 2024-11-06 DIAGNOSIS — I10 PRIMARY HYPERTENSION: ICD-10-CM

## 2024-11-06 NOTE — TELEPHONE ENCOUNTER
Received request via: Pharmacy    Was the patient seen in the last year in this department? No    Does the patient have an active prescription (recently filled or refills available) for medication(s) requested? No    Ph[facility] Name[facility]     Does the patient have long term Plus and need 100-day supply? (This applies to ALL medications) Patient does not have SCP

## 2024-11-07 RX ORDER — CARVEDILOL 3.12 MG/1
TABLET ORAL
Qty: 180 TABLET | Refills: 1 | Status: SHIPPED | OUTPATIENT
Start: 2024-11-07

## 2025-05-01 DIAGNOSIS — I10 PRIMARY HYPERTENSION: ICD-10-CM

## 2025-05-01 RX ORDER — CARVEDILOL 3.12 MG/1
TABLET ORAL
Qty: 180 TABLET | Refills: 1 | Status: SHIPPED | OUTPATIENT
Start: 2025-05-01

## 2025-05-01 NOTE — TELEPHONE ENCOUNTER
Received request via: Pharmacy    Was the patient seen in the last year in this department? No    Does the patient have an active prescription (recently filled or refills available) for medication(s) requested? No    Pharmacy Name: [facility]/pharmacy #[id number] - [place], [place] - [address] AT [address] [facility], [place] [address]     Does the patient have group home Plus and need 100-day supply? (This applies to ALL medications) Patient does not have SCP